# Patient Record
Sex: FEMALE | Race: WHITE | NOT HISPANIC OR LATINO | Employment: FULL TIME | URBAN - METROPOLITAN AREA
[De-identification: names, ages, dates, MRNs, and addresses within clinical notes are randomized per-mention and may not be internally consistent; named-entity substitution may affect disease eponyms.]

---

## 2019-11-27 ENCOUNTER — OFFICE VISIT (OUTPATIENT)
Dept: INTERNAL MEDICINE | Facility: CLINIC | Age: 24
End: 2019-11-27

## 2019-11-27 VITALS
TEMPERATURE: 98.7 F | HEART RATE: 100 BPM | HEIGHT: 64 IN | SYSTOLIC BLOOD PRESSURE: 116 MMHG | BODY MASS INDEX: 26.41 KG/M2 | OXYGEN SATURATION: 98 % | WEIGHT: 154.7 LBS | DIASTOLIC BLOOD PRESSURE: 76 MMHG

## 2019-11-27 DIAGNOSIS — K21.9 GASTROESOPHAGEAL REFLUX DISEASE, ESOPHAGITIS PRESENCE NOT SPECIFIED: ICD-10-CM

## 2019-11-27 DIAGNOSIS — Z00.00 HEALTH CARE MAINTENANCE: ICD-10-CM

## 2019-11-27 DIAGNOSIS — G43.109 MIGRAINE WITH AURA AND WITHOUT STATUS MIGRAINOSUS, NOT INTRACTABLE: ICD-10-CM

## 2019-11-27 DIAGNOSIS — D50.9 IRON DEFICIENCY ANEMIA, UNSPECIFIED IRON DEFICIENCY ANEMIA TYPE: ICD-10-CM

## 2019-11-27 DIAGNOSIS — R19.8 ALTERNATING CONSTIPATION AND DIARRHEA: ICD-10-CM

## 2019-11-27 DIAGNOSIS — J30.9 ALLERGIC RHINITIS, UNSPECIFIED SEASONALITY, UNSPECIFIED TRIGGER: ICD-10-CM

## 2019-11-27 DIAGNOSIS — F32.A DEPRESSION, UNSPECIFIED DEPRESSION TYPE: Primary | ICD-10-CM

## 2019-11-27 PROBLEM — J45.909 ASTHMA: Status: ACTIVE | Noted: 2019-11-27

## 2019-11-27 PROBLEM — G43.909 MIGRAINE: Status: ACTIVE | Noted: 2019-07-22

## 2019-11-27 PROCEDURE — 90471 IMMUNIZATION ADMIN: CPT | Performed by: NURSE PRACTITIONER

## 2019-11-27 PROCEDURE — 99204 OFFICE O/P NEW MOD 45 MIN: CPT | Performed by: NURSE PRACTITIONER

## 2019-11-27 PROCEDURE — 90674 CCIIV4 VAC NO PRSV 0.5 ML IM: CPT | Performed by: NURSE PRACTITIONER

## 2019-11-27 RX ORDER — PANTOPRAZOLE SODIUM 40 MG/1
40 TABLET, DELAYED RELEASE ORAL DAILY
Qty: 30 TABLET | Refills: 6 | Status: SHIPPED | OUTPATIENT
Start: 2019-11-27

## 2019-11-27 RX ORDER — OMEPRAZOLE 20 MG/1
20 CAPSULE, DELAYED RELEASE ORAL DAILY
COMMUNITY
Start: 2019-07-22 | End: 2019-11-27

## 2019-11-27 RX ORDER — TOPIRAMATE 25 MG/1
25 TABLET ORAL 2 TIMES DAILY
Qty: 60 TABLET | Refills: 11
Start: 2019-11-27 | End: 2020-02-27

## 2019-11-27 RX ORDER — PANTOPRAZOLE SODIUM 40 MG/1
40 TABLET, DELAYED RELEASE ORAL DAILY
Qty: 30 TABLET | Refills: 6 | Status: SHIPPED | OUTPATIENT
Start: 2019-11-27 | End: 2019-11-27 | Stop reason: SDUPTHER

## 2019-11-27 RX ORDER — TOPIRAMATE 25 MG/1
25 TABLET ORAL
COMMUNITY
Start: 2019-07-22 | End: 2019-11-27 | Stop reason: SDUPTHER

## 2019-11-27 RX ORDER — AZELASTINE HYDROCHLORIDE, FLUTICASONE PROPIONATE 137; 50 UG/1; UG/1
2 SPRAY, METERED NASAL
COMMUNITY
Start: 2019-09-18 | End: 2020-02-27

## 2019-11-27 RX ORDER — MONTELUKAST SODIUM 10 MG/1
TABLET ORAL
COMMUNITY
Start: 2019-10-20 | End: 2020-02-27

## 2019-11-27 NOTE — PROGRESS NOTES
"Subjective   Britney Cano is a 24 y.o. female.     History of Present Illness    The patient is here today with c/o intermittent diarrhea and constipation.   Had GB removed at 16 and has had problems since then.   Bowel obstruction and impaction a year later. She had a GI specialist for several years. She was transitioned from all meds to fiber and magnesium. These did not help and actually caused some nausea with magnesium.   GERD- She is going through a bottle of Tums a month even on the prilosec.   Moved here 3 yrs ago, was seeing a [place] doc. He was disrespectful.   Feels her health is ok, better than it has been in the past.     PSVT- seeing Dr. Brewster, had a holter in monitor, nl just fast per patient, to f/u in December  Migraines- \"really bad for the past 4 years\". Started topamax 4 months ago. 1 migraine a month.   AR- saw ENT in October b/c she had been going to [facility], had ear pain and sinusitis. She took antibiotic X2 and was told she didn't have an ear infection. TMJ  She has 5 sinus infections since August. She has not had allergy testing since childhood.   Asthma- allergy related, has not need an inhaler for a few years.   Depression- \"better now\"    Last week felt off balance, dizzy, headache, BP was 130/100, checked at nurses office. This has never happened before.     Reglan- seizure X1, none since    CPE- due next August  GYN- UTD, doing well, periods are absent, G-0  , from [place].   The following portions of the patient's history were reviewed and updated as appropriate: allergies, current medications, past family history, past medical history, past social history, past surgical history and problem list.    Review of Systems   Constitutional: Negative for chills and fever.   HENT: Positive for congestion, postnasal drip and rhinorrhea.    Respiratory: Negative.    Cardiovascular: Negative.    Gastrointestinal: Positive for constipation, diarrhea and GERD. "   Psychiatric/Behavioral: Negative for dysphoric mood and suicidal ideas. The patient is not nervous/anxious.        Objective   Physical Exam   Constitutional: She appears well-developed and well-nourished.   HENT:   Right Ear: Hearing and external ear normal. A foreign body (cerumen obstructing TM) is present.   Left Ear: Hearing, tympanic membrane, external ear and ear canal normal.   Nose: No mucosal edema, rhinorrhea or sinus tenderness. Right sinus exhibits no maxillary sinus tenderness and no frontal sinus tenderness. Left sinus exhibits maxillary sinus tenderness. Left sinus exhibits no frontal sinus tenderness.   Neck: Normal range of motion. Neck supple. No thyromegaly present.   Cardiovascular: Normal rate, regular rhythm, normal heart sounds and intact distal pulses.   Pulmonary/Chest: Effort normal and breath sounds normal.   Abdominal: Soft. Normal appearance and bowel sounds are normal. There is no hepatosplenomegaly. There is tenderness in the epigastric area.   Lymphadenopathy:     She has no cervical adenopathy.   Skin: Skin is warm and dry.   Psychiatric: She has a normal mood and affect. Her behavior is normal. Judgment and thought content normal.       Vitals:    11/27/19 1028   BP: 116/76   Pulse: 100   Temp: 98.7 °F (37.1 °C)   SpO2: 98%     Body mass index is 26.72 kg/m².      Assessment/Plan   Britney was seen today for diarrhea, constipation, sinusitis and migraine.    Diagnoses and all orders for this visit:    Depression, unspecified depression type  -     CBC & Differential  -     Comprehensive Metabolic Panel  -     Lipid Panel With LDL / HDL Ratio  -     TSH Rfx On Abnormal To Free T4  -     Vitamin D 25 Hydroxy    Migraine with aura and without status migrainosus, not intractable  -     CBC & Differential  -     Comprehensive Metabolic Panel  -     Lipid Panel With LDL / HDL Ratio  -     TSH Rfx On Abnormal To Free T4  -     Vitamin D 25 Hydroxy    Gastroesophageal reflux disease,  esophagitis presence not specified  -     pantoprazole (PROTONIX) 40 MG EC tablet; Take 1 tablet by mouth Daily.  -     Ambulatory Referral to Gastroenterology  -     CBC & Differential  -     Comprehensive Metabolic Panel  -     Lipid Panel With LDL / HDL Ratio  -     TSH Rfx On Abnormal To Free T4  -     Vitamin D 25 Hydroxy    Allergic rhinitis, unspecified seasonality, unspecified trigger  -     Ambulatory Referral to Allergy  -     CBC & Differential  -     Comprehensive Metabolic Panel  -     Lipid Panel With LDL / HDL Ratio  -     TSH Rfx On Abnormal To Free T4  -     Vitamin D 25 Hydroxy    Iron deficiency anemia, unspecified iron deficiency anemia type  -     CBC & Differential  -     Comprehensive Metabolic Panel  -     Lipid Panel With LDL / HDL Ratio  -     TSH Rfx On Abnormal To Free T4  -     Vitamin D 25 Hydroxy    Alternating constipation and diarrhea  -     Ambulatory Referral to Gastroenterology  -     CBC & Differential  -     Comprehensive Metabolic Panel  -     Lipid Panel With LDL / HDL Ratio  -     TSH Rfx On Abnormal To Free T4  -     Vitamin D 25 Hydroxy    Health care maintenance  -     CBC & Differential  -     Comprehensive Metabolic Panel  -     Lipid Panel With LDL / HDL Ratio  -     TSH Rfx On Abnormal To Free T4  -     Vitamin D 25 Hydroxy    Other orders  -     topiramate (TOPAMAX) 25 MG tablet; Take 1 tablet by mouth 2 (Two) Times a Day.                 1. Depression- self managed  2. Migraines- doing well, continue same  3. GERD- change protonix, low acid diet  4. AR- needs allergy referral   5. Iron def anemia- check labs, no more periods  6. Constipation and diarrhea- miralax as needed, refer to GI  7. Elevated BP- monitor, hydrate well    Flu vaccine- today  C-scope- 2011,2013 no polyps, neg for UC and chrons

## 2019-11-28 LAB
25(OH)D3+25(OH)D2 SERPL-MCNC: 16.9 NG/ML (ref 30–100)
ALBUMIN SERPL-MCNC: 4.9 G/DL (ref 3.5–5.2)
ALBUMIN/GLOB SERPL: 1.9 G/DL
ALP SERPL-CCNC: 90 U/L (ref 39–117)
ALT SERPL-CCNC: 9 U/L (ref 1–33)
AST SERPL-CCNC: 13 U/L (ref 1–32)
BASOPHILS # BLD AUTO: 0.02 10*3/MM3 (ref 0–0.2)
BASOPHILS NFR BLD AUTO: 0.3 % (ref 0–1.5)
BILIRUB SERPL-MCNC: 0.6 MG/DL (ref 0.2–1.2)
BUN SERPL-MCNC: 10 MG/DL (ref 6–20)
BUN/CREAT SERPL: 15.9 (ref 7–25)
CALCIUM SERPL-MCNC: 9.7 MG/DL (ref 8.6–10.5)
CHLORIDE SERPL-SCNC: 106 MMOL/L (ref 98–107)
CHOLEST SERPL-MCNC: 202 MG/DL (ref 0–200)
CO2 SERPL-SCNC: 21.8 MMOL/L (ref 22–29)
CREAT SERPL-MCNC: 0.63 MG/DL (ref 0.57–1)
EOSINOPHIL # BLD AUTO: 0.02 10*3/MM3 (ref 0–0.4)
EOSINOPHIL NFR BLD AUTO: 0.3 % (ref 0.3–6.2)
ERYTHROCYTE [DISTWIDTH] IN BLOOD BY AUTOMATED COUNT: 12 % (ref 12.3–15.4)
GLOBULIN SER CALC-MCNC: 2.6 GM/DL
GLUCOSE SERPL-MCNC: 87 MG/DL (ref 65–99)
HCT VFR BLD AUTO: 41.4 % (ref 34–46.6)
HDLC SERPL-MCNC: 59 MG/DL (ref 40–60)
HGB BLD-MCNC: 14.1 G/DL (ref 12–15.9)
IMM GRANULOCYTES # BLD AUTO: 0.02 10*3/MM3 (ref 0–0.05)
IMM GRANULOCYTES NFR BLD AUTO: 0.3 % (ref 0–0.5)
LDLC SERPL CALC-MCNC: 115 MG/DL (ref 0–100)
LDLC/HDLC SERPL: 1.94 {RATIO}
LYMPHOCYTES # BLD AUTO: 1.99 10*3/MM3 (ref 0.7–3.1)
LYMPHOCYTES NFR BLD AUTO: 28.6 % (ref 19.6–45.3)
MCH RBC QN AUTO: 29.9 PG (ref 26.6–33)
MCHC RBC AUTO-ENTMCNC: 34.1 G/DL (ref 31.5–35.7)
MCV RBC AUTO: 87.7 FL (ref 79–97)
MONOCYTES # BLD AUTO: 0.35 10*3/MM3 (ref 0.1–0.9)
MONOCYTES NFR BLD AUTO: 5 % (ref 5–12)
NEUTROPHILS # BLD AUTO: 4.55 10*3/MM3 (ref 1.7–7)
NEUTROPHILS NFR BLD AUTO: 65.5 % (ref 42.7–76)
NRBC BLD AUTO-RTO: 0 /100 WBC (ref 0–0.2)
PLATELET # BLD AUTO: 277 10*3/MM3 (ref 140–450)
POTASSIUM SERPL-SCNC: 3.8 MMOL/L (ref 3.5–5.2)
PROT SERPL-MCNC: 7.5 G/DL (ref 6–8.5)
RBC # BLD AUTO: 4.72 10*6/MM3 (ref 3.77–5.28)
SODIUM SERPL-SCNC: 141 MMOL/L (ref 136–145)
TRIGL SERPL-MCNC: 142 MG/DL (ref 0–150)
TSH SERPL DL<=0.005 MIU/L-ACNC: 0.88 UIU/ML (ref 0.27–4.2)
VLDLC SERPL CALC-MCNC: 28.4 MG/DL
WBC # BLD AUTO: 6.95 10*3/MM3 (ref 3.4–10.8)

## 2019-12-23 ENCOUNTER — TELEPHONE (OUTPATIENT)
Dept: INTERNAL MEDICINE | Facility: CLINIC | Age: 24
End: 2019-12-23

## 2019-12-23 RX ORDER — ERGOCALCIFEROL 1.25 MG/1
50000 CAPSULE ORAL WEEKLY
Qty: 8 CAPSULE | Refills: 0 | Status: SHIPPED | OUTPATIENT
Start: 2019-12-23 | End: 2020-02-27

## 2019-12-23 NOTE — TELEPHONE ENCOUNTER
----- Message from CLAUDETTE Douglass sent at 12/23/2019  4:42 PM EST -----  Mild cholesterol elevation, mediterranean style diet and exercise.  Vitamin D deficiency, start vitamin D2 50,000 IUs once weekly for 8 weeks then take vitamin D3 2000 IUs daily.  Recheck vitamin D in 3 months.  ----- Message -----  From: Jose Jara  Sent: 12/23/2019   3:48 PM EST  To: CLAUDETTE Douglass        ----- Message -----  From: Jolie Wells RegSched Rep  Sent: 12/23/2019   3:32 PM EST  To: Jose Jara    Pt was calling for the results of her blood work.

## 2020-01-02 ENCOUNTER — OFFICE VISIT (OUTPATIENT)
Dept: GASTROENTEROLOGY | Facility: CLINIC | Age: 25
End: 2020-01-02

## 2020-01-02 VITALS
DIASTOLIC BLOOD PRESSURE: 68 MMHG | BODY MASS INDEX: 25.88 KG/M2 | HEIGHT: 64 IN | TEMPERATURE: 98.7 F | WEIGHT: 151.6 LBS | SYSTOLIC BLOOD PRESSURE: 112 MMHG

## 2020-01-02 DIAGNOSIS — K58.2 IRRITABLE BOWEL SYNDROME WITH BOTH CONSTIPATION AND DIARRHEA: Primary | ICD-10-CM

## 2020-01-02 PROCEDURE — 99203 OFFICE O/P NEW LOW 30 MIN: CPT | Performed by: INTERNAL MEDICINE

## 2020-01-02 RX ORDER — CALCIUM CARBONATE 200(500)MG
1 TABLET,CHEWABLE ORAL DAILY
COMMUNITY

## 2020-01-02 RX ORDER — PRENATAL VIT NO.126/IRON/FOLIC 28MG-0.8MG
TABLET ORAL DAILY
COMMUNITY

## 2020-01-02 NOTE — PROGRESS NOTES
Answers for HPI/ROS submitted by the patient on 1/2/2020   Abdominal pain  Chronicity: chronic  Onset: more than 1 year ago  Onset quality: sudden  Progression since onset: waxing and waning  Pain location: RUQ, suprapubic region  Pain quality: cramping, sharp  anorexia: No  arthralgias: No  belching: Yes  constipation: Yes  diarrhea: Yes  dysuria: No  fever: No  flatus: Yes  frequency: No  headaches: No  hematochezia: Yes  hematuria: No  melena: No  myalgias: No  nausea: Yes  weight loss: No  vomiting: No  Aggravated by: certain positions, eating  Relieved by: belching, bowel movements, passing flatus  Chief Complaint   Patient presents with   • Constipation   • Diarrhea     Britney Cano is a 24 y.o. female who presents with a history of IBS and constipation  HPI     Patient 24-year-old female with history of asthma, anemia, acid reflux and IBS here for recommendations.  Patient seen by previous gastroenterologist in [place].  Chart in epic from [place] was reviewed and patient noted with chronic constipation having tried Linzess without success as the 290 was too strong in the 145 was inadequate.  Patient was then tried on magnesium oxide but moved shortly after.  Patient reports still symptomatic as the magnesium oxide had the same issue the high dose was too strong the lower dose was unsuccessful and patient has been going for the last 3 years untreated.  Patient continues to have alternating stools now here for further recommendations.    Past Medical History:   Diagnosis Date   • Acid reflux    • Allergic    • Anemia    • Asthma    • Depression    • Headache    • Irritable bowel syndrome    • Menstrual problem        Current Outpatient Medications:   •  calcium carbonate (TUMS) 500 MG chewable tablet, Chew 1 tablet Daily., Disp: , Rfl:   •  montelukast (SINGULAIR) 10 MG tablet, , Disp: , Rfl:   •  pantoprazole (PROTONIX) 40 MG EC tablet, Take 1 tablet by mouth Daily., Disp: 30 tablet, Rfl: 6  •  Prenatal  Vit-Fe Fumarate-FA (PRENATAL, CLASSIC, VITAMIN) 28-0.8 MG tablet tablet, Take  by mouth Daily., Disp: , Rfl:   •  topiramate (TOPAMAX) 25 MG tablet, Take 1 tablet by mouth 2 (Two) Times a Day., Disp: 60 tablet, Rfl: 11  •  vitamin D (ERGOCALCIFEROL) 1.25 MG (68925 UT) capsule capsule, Take 1 capsule by mouth 1 (One) Time Per Week., Disp: 8 capsule, Rfl: 0  •  Azelastine-Fluticasone 137-50 MCG/ACT suspension, 2 sprays into the nostril(s) as directed by provider., Disp: , Rfl:   •  saline 0.65 % nasal solution (BABY AYR) 0.65 % solution, 2 sprays into the nostril(s) as directed by provider., Disp: , Rfl:   Allergies   Allergen Reactions   • Metoclopramide Other (See Comments)     Siezures     • Azithromycin Rash   • Cefprozil Rash   • Levofloxacin Hives and Rash   • Loracarbef Itching and Rash   • Sulfa Antibiotics Rash     Social History     Socioeconomic History   • Marital status:      Spouse name: Julius   • Number of children: Not on file   • Years of education: Not on file   • Highest education level: Not on file   Occupational History   • Occupation: Teacher   Tobacco Use   • Smoking status: Never Smoker   • Smokeless tobacco: Never Used   Substance and Sexual Activity   • Alcohol use: No     Frequency: Never   • Drug use: No   • Sexual activity: Yes     Partners: Male     Birth control/protection: Pill   Lifestyle   • Physical activity:     Days per week: 0 days     Minutes per session: 0 min   • Stress: Not on file     Family History   Problem Relation Age of Onset   • Thyroid disease Mother    • Hypertension Father    • Diabetes Father    • Asthma Sister    • Asthma Brother    • Anxiety disorder Brother    • Cervical cancer Maternal Grandmother    • Hypertension Maternal Grandfather    • Diabetes Maternal Grandfather    • Liver cancer Maternal Grandfather    • Pancreatic cancer Maternal Grandfather    • Urolithiasis Maternal Grandfather    • Nephrolithiasis Paternal Grandmother    • Heart disease  Paternal Grandfather    • Hypertension Paternal Grandfather    • Diabetes Paternal Grandfather      Review of Systems   Constitutional: Negative.  Negative for fever.   HENT: Negative.    Eyes: Negative.    Respiratory: Negative.    Cardiovascular: Negative.    Gastrointestinal: Positive for abdominal pain, constipation, diarrhea and nausea. Negative for vomiting.   Endocrine: Negative.    Genitourinary: Negative for dysuria, frequency and hematuria.   Musculoskeletal: Negative.  Negative for arthralgias and myalgias.   Skin: Negative.    Allergic/Immunologic: Negative.    Neurological: Negative for headaches.   Hematological: Negative.      Vitals:    01/02/20 1022   BP: 112/68   Temp: 98.7 °F (37.1 °C)     Physical Exam   Constitutional: She is oriented to person, place, and time. She appears well-developed and well-nourished.   HENT:   Head: Normocephalic and atraumatic.   Eyes: Pupils are equal, round, and reactive to light. No scleral icterus.   Neck: Normal range of motion.   Cardiovascular: Normal rate, regular rhythm and normal heart sounds. Exam reveals no gallop and no friction rub.   No murmur heard.  Pulmonary/Chest: Effort normal and breath sounds normal. She has no wheezes. She has no rales.   Abdominal: Soft. Bowel sounds are normal. She exhibits no shifting dullness, no distension, no pulsatile liver, no fluid wave, no abdominal bruit, no ascites, no pulsatile midline mass and no mass. There is no hepatosplenomegaly. There is no tenderness. There is no rigidity and no guarding. No hernia.   Musculoskeletal: Normal range of motion. She exhibits no edema.   Lymphadenopathy:     She has no cervical adenopathy.   Neurological: She is alert and oriented to person, place, and time. No cranial nerve deficit.   Skin: Skin is warm and dry. No rash noted.   Psychiatric: She has a normal mood and affect. Her behavior is normal.   Nursing note and vitals reviewed.    Diagnoses and all orders for this  visit:    Irritable bowel syndrome with both constipation and diarrhea    Other orders  -     Prenatal Vit-Fe Fumarate-FA (PRENATAL, CLASSIC, VITAMIN) 28-0.8 MG tablet tablet; Take  by mouth Daily.  -     calcium carbonate (TUMS) 500 MG chewable tablet; Chew 1 tablet Daily.    Patient is a 24-year-old female with history of asthma reflux and IBS presenting in follow-up.  Patient with longstanding history of constipation now alternating with diarrhea who had been on number different medications but unfortunately not well tolerated.  When she last saw her gastroenterologist in [place], on my chart review, it was noted patient was given trial of magnesium oxide to help her symptoms.  Patient moved to [place] after that visit but reports since it did not help patient stopped taking her medications.  Patient continues to have recurrent abdominal pain and constipation with occasional overflow diarrhea.  Patient underwent upper and lower endoscopy in 2013 that were unremarkable patient now for further recommendations.  Patient has not tried Amitiza so we will give samples and begin trial of 24 mcg twice daily and adjust for clinical response.  If symptoms do not improve or patient cannot tolerate will arrange Sitzmarks test to evaluate motility further.

## 2020-02-11 ENCOUNTER — CLINICAL SUPPORT (OUTPATIENT)
Dept: INTERNAL MEDICINE | Facility: CLINIC | Age: 25
End: 2020-02-11

## 2020-02-11 DIAGNOSIS — Z23 NEED FOR PNEUMOCOCCAL VACCINATION: ICD-10-CM

## 2020-02-11 PROCEDURE — 90732 PPSV23 VACC 2 YRS+ SUBQ/IM: CPT | Performed by: NURSE PRACTITIONER

## 2020-02-11 PROCEDURE — 90471 IMMUNIZATION ADMIN: CPT | Performed by: NURSE PRACTITIONER

## 2020-02-20 ENCOUNTER — TELEPHONE (OUTPATIENT)
Dept: INTERNAL MEDICINE | Facility: CLINIC | Age: 25
End: 2020-02-20

## 2020-02-20 NOTE — TELEPHONE ENCOUNTER
I spoke with patient and informed her she is due to have Vitamin D level checked at the end of March. Patient verbalizes understanding.    Patient says she is due to have more pneumovax labs drawn. She has a lab order from her allergist. I told her she can schedule a lab appointment and to make sure she brings in lab order from allergist.

## 2020-02-20 NOTE — TELEPHONE ENCOUNTER
----- Message from Claritza Gallagher sent at 2/20/2020 12:03 PM EST -----  Regarding: APPT REQUEST?  Patient called to schedule a 1 month follow up on the pneumovax. I scheduled her an appt but not sure if she needs it. Please call to discuss. Thanks

## 2020-02-27 ENCOUNTER — OFFICE VISIT (OUTPATIENT)
Dept: INTERNAL MEDICINE | Facility: CLINIC | Age: 25
End: 2020-02-27

## 2020-02-27 VITALS
HEART RATE: 90 BPM | WEIGHT: 155.5 LBS | OXYGEN SATURATION: 98 % | BODY MASS INDEX: 26.55 KG/M2 | TEMPERATURE: 98.9 F | HEIGHT: 64 IN | DIASTOLIC BLOOD PRESSURE: 80 MMHG | SYSTOLIC BLOOD PRESSURE: 110 MMHG

## 2020-02-27 DIAGNOSIS — I51.89 DIASTOLIC DYSFUNCTION: ICD-10-CM

## 2020-02-27 DIAGNOSIS — I10 ESSENTIAL HYPERTENSION: ICD-10-CM

## 2020-02-27 DIAGNOSIS — E55.9 VITAMIN D DEFICIENCY: ICD-10-CM

## 2020-02-27 DIAGNOSIS — F41.9 ANXIETY: Primary | ICD-10-CM

## 2020-02-27 PROBLEM — R06.00 DYSPNEA: Status: ACTIVE | Noted: 2019-12-09

## 2020-02-27 PROBLEM — R00.0 TACHYCARDIA: Status: ACTIVE | Noted: 2019-12-09

## 2020-02-27 LAB
B-HCG UR QL: NEGATIVE
INTERNAL NEGATIVE CONTROL: NEGATIVE
INTERNAL POSITIVE CONTROL: POSITIVE
Lab: NORMAL

## 2020-02-27 PROCEDURE — 81025 URINE PREGNANCY TEST: CPT | Performed by: NURSE PRACTITIONER

## 2020-02-27 PROCEDURE — 99214 OFFICE O/P EST MOD 30 MIN: CPT | Performed by: NURSE PRACTITIONER

## 2020-02-27 RX ORDER — CHOLECALCIFEROL (VITAMIN D3) 25 MCG
1000 CAPSULE ORAL DAILY
Qty: 30 CAPSULE | Refills: 5 | Status: SHIPPED | OUTPATIENT
Start: 2020-02-27

## 2020-02-27 RX ORDER — SERTRALINE HYDROCHLORIDE 25 MG/1
25 TABLET, FILM COATED ORAL DAILY
Qty: 30 TABLET | Refills: 6 | Status: SHIPPED | OUTPATIENT
Start: 2020-02-27

## 2020-02-27 NOTE — PROGRESS NOTES
Subjective   Britney Cano is a 25 y.o. female.     History of Present Illness    The patient is here today with c/o elevated BP.   Hx of episode with pressure in the head, dizzy, hot months ago. She has since monitored and notes diastolic number is a little high.   She reports she saw her cardiologist and ECHO showed grade 1 diastolic dysfunction.    Migraines- off and on, twice a month, has not been taking topamax, eating better with mediterranean style diet   Vit D def- taking 1000 IU daily    Trying to get pregnant since January. Currently 2 weeks late with her period, all home tests negative 1 week ago.   The following portions of the patient's history were reviewed and updated as appropriate: allergies, current medications, past family history, past medical history, past social history, past surgical history and problem list.    Review of Systems   Constitutional: Negative.    Respiratory: Positive for cough (improving post bronchitis) and shortness of breath ( reports she is breathing heavier).    Cardiovascular: Negative for chest pain and palpitations (sometimes racing).   Psychiatric/Behavioral: Negative for suicidal ideas and depressed mood. The patient is nervous/anxious.        Objective   Physical Exam   Constitutional: She appears well-developed and well-nourished.   Neck: Normal range of motion. Neck supple. No thyromegaly present.   Cardiovascular: Normal rate, regular rhythm, normal heart sounds and intact distal pulses.   Pulmonary/Chest: Effort normal and breath sounds normal.   Lymphadenopathy:     She has no cervical adenopathy.   Skin: Skin is warm and dry.   Psychiatric: She has a normal mood and affect. Her behavior is normal. Judgment and thought content normal.       Vitals:    02/27/20 1117   BP: 110/80   Pulse: 90   Temp: 98.9 °F (37.2 °C)   SpO2: 98%     Body mass index is 26.84 kg/m².      Assessment/Plan   Britney was seen today for bp follow up, gerd and headache.    Diagnoses  and all orders for this visit:    Anxiety    Diastolic dysfunction    Essential hypertension    Vitamin D deficiency  -     Vitamin D 25 Hydroxy    Other orders  -     Cholecalciferol (VITAMIN D-3) 25 MCG (1000 UT) capsule; Take 1,000 Units by mouth Daily.  -     sertraline (ZOLOFT) 25 MG tablet; Take 1 tablet by mouth Daily.                 1. Anxiety- try zoloft 25 mg daily, discuss with OBGYN prior to starting, urine HCG negative  2. Diastolic dysfunction/elevated diastolic BP- continue to monitor, will treat BP first.   3. Vit D def- would like lab checked today

## 2020-02-28 LAB — 25(OH)D3+25(OH)D2 SERPL-MCNC: 24.6 NG/ML (ref 30–100)

## 2020-03-03 ENCOUNTER — OFFICE VISIT (OUTPATIENT)
Dept: GASTROENTEROLOGY | Facility: CLINIC | Age: 25
End: 2020-03-03

## 2020-03-03 VITALS
HEIGHT: 64 IN | BODY MASS INDEX: 26.53 KG/M2 | TEMPERATURE: 98.5 F | SYSTOLIC BLOOD PRESSURE: 116 MMHG | DIASTOLIC BLOOD PRESSURE: 80 MMHG | WEIGHT: 155.4 LBS

## 2020-03-03 DIAGNOSIS — K59.04 CHRONIC IDIOPATHIC CONSTIPATION: Primary | ICD-10-CM

## 2020-03-03 PROCEDURE — 99213 OFFICE O/P EST LOW 20 MIN: CPT | Performed by: INTERNAL MEDICINE

## 2020-03-03 NOTE — PROGRESS NOTES
Chief Complaint   Patient presents with   • Irritable Bowel Syndrome       Britney Cano is a  25 y.o. female here for a follow up visit for IBS with constipation.    HPI     Patient 25-year-old female with history of IBS and constipation here for follow-up.  Patient reports still symptomatic unable to tolerate the amities as it caused nausea.  Patient given prescription for truLance but is been unable to fill it due to coverage issues.  Patient here for further recommendations.    Past Medical History:   Diagnosis Date   • Acid reflux    • Allergic    • Anemia    • Asthma    • Depression    • Headache    • Irritable bowel syndrome    • Menstrual problem          Current Outpatient Medications:   •  calcium carbonate (TUMS) 500 MG chewable tablet, Chew 1 tablet Daily., Disp: , Rfl:   •  Cholecalciferol (VITAMIN D-3) 25 MCG (1000 UT) capsule, Take 1,000 Units by mouth Daily., Disp: 30 capsule, Rfl: 5  •  pantoprazole (PROTONIX) 40 MG EC tablet, Take 1 tablet by mouth Daily., Disp: 30 tablet, Rfl: 6  •  Prenatal Vit-Fe Fumarate-FA (PRENATAL, CLASSIC, VITAMIN) 28-0.8 MG tablet tablet, Take  by mouth Daily., Disp: , Rfl:   •  sertraline (ZOLOFT) 25 MG tablet, Take 1 tablet by mouth Daily., Disp: 30 tablet, Rfl: 6  •  Plecanatide (TRULANCE) 3 MG tablet, Take 1 tablet by mouth Daily., Disp: 30 tablet, Rfl: 11    Allergies   Allergen Reactions   • Metoclopramide Other (See Comments)     Siezures     • Azithromycin Rash   • Cefprozil Rash   • Levofloxacin Hives and Rash   • Loracarbef Itching and Rash   • Sulfa Antibiotics Rash       Social History     Socioeconomic History   • Marital status:      Spouse name: Julius   • Number of children: Not on file   • Years of education: Not on file   • Highest education level: Not on file   Occupational History   • Occupation: Teacher   Tobacco Use   • Smoking status: Never Smoker   • Smokeless tobacco: Never Used   Substance and Sexual Activity   • Alcohol use: No     Frequency:  Never   • Drug use: No   • Sexual activity: Yes     Partners: Male     Birth control/protection: Pill   Lifestyle   • Physical activity:     Days per week: 0 days     Minutes per session: 0 min   • Stress: Not on file       Family History   Problem Relation Age of Onset   • Thyroid disease Mother    • Hypertension Father    • Diabetes Father    • Asthma Sister    • Asthma Brother    • Anxiety disorder Brother    • Cervical cancer Maternal Grandmother    • Hypertension Maternal Grandfather    • Diabetes Maternal Grandfather    • Liver cancer Maternal Grandfather    • Pancreatic cancer Maternal Grandfather    • Urolithiasis Maternal Grandfather    • Nephrolithiasis Paternal Grandmother    • Heart disease Paternal Grandfather    • Hypertension Paternal Grandfather    • Diabetes Paternal Grandfather        Review of Systems   Constitutional: Negative.    Respiratory: Negative.    Cardiovascular: Negative.    Gastrointestinal: Negative.    Skin: Negative.    Hematological: Negative.        Vitals:    03/03/20 1426   BP: 116/80   Temp: 98.5 °F (36.9 °C)       Physical Exam   Constitutional: She is oriented to person, place, and time. She appears well-developed and well-nourished.   HENT:   Head: Normocephalic and atraumatic.   Eyes: Pupils are equal, round, and reactive to light. No scleral icterus.   Cardiovascular: Normal rate, regular rhythm and normal heart sounds.   Pulmonary/Chest: Effort normal and breath sounds normal.   Abdominal: Soft. Bowel sounds are normal. She exhibits no distension and no mass. There is no tenderness. No hernia.   Neurological: She is alert and oriented to person, place, and time.   Skin: Skin is warm and dry.   Psychiatric: She has a normal mood and affect. Her behavior is normal.   Vitals reviewed.      Office Visit on 02/27/2020   Component Date Value Ref Range Status   • 25 Hydroxy, Vitamin D 02/27/2020 24.6* 30.0 - 100.0 ng/ml Final   • HCG, Urine, QL 02/27/2020 Negative  Negative  Final   • Lot Number 02/27/2020 [id number]   Final   • Internal Positive Control 02/27/2020 Positive   Final   • Internal Negative Control 02/27/2020 Negative   Final       Britney was seen today for irritable bowel syndrome.    Diagnoses and all orders for this visit:    Chronic idiopathic constipation      Patient with history of chronic idiopathic constipation here for follow-up.  Patient unable to tolerate amities a, became nauseated.  Patient was ordered truLance but pharmacy still awaiting approval to fill it.  No information sent to us about prior Auth.  Unfortunately have no samples but did have some samples of Motegrity, will give 2 weeks worth of samples to see if it improves patient symptomatically.  We will continue to monitor as needed for symptom response.

## 2020-03-20 ENCOUNTER — TELEPHONE (OUTPATIENT)
Dept: GASTROENTEROLOGY | Facility: CLINIC | Age: 25
End: 2020-03-20

## 2020-03-20 NOTE — TELEPHONE ENCOUNTER
----- Message from Kateryna Guzman sent at 3/20/2020  2:53 PM EDT -----  Regarding: Medication Update   Contact: [phone]  Pt stated she was given a sample of Motegrity and that the medication is working and would like a prescription called in to her pharmacy

## 2020-03-24 ENCOUNTER — TELEPHONE (OUTPATIENT)
Dept: GASTROENTEROLOGY | Facility: CLINIC | Age: 25
End: 2020-03-24

## 2020-03-24 NOTE — TELEPHONE ENCOUNTER
----- Message from Kateryna Guzman Rep sent at 3/24/2020  2:09 PM EDT -----  Regarding: Insurance won't cover medication   Contact: [phone]  Pt stated insurance will not cover Motegrity and that [facility] faxed over 3 medications that are approved. Pt stated she tried Amitiza before but it didn't work, and asked to try one of the other two (pt did not remember the names of the other two).    Pt also stated [place] and asked that the prescription be called in to:    [facility]  [address] [address]    Phone: [phone]

## 2020-03-24 NOTE — TELEPHONE ENCOUNTER
Returned patient's phone call. Advised she will need to get the names of the medications her insurance will cover. She states she will and call back.

## 2020-03-27 ENCOUNTER — TELEPHONE (OUTPATIENT)
Dept: GASTROENTEROLOGY | Facility: CLINIC | Age: 25
End: 2020-03-27

## 2020-03-27 NOTE — TELEPHONE ENCOUNTER
Returned patient's phone call. She states her oop cost Linzess would be over $300. She states she has not met her deductible yet and any of the prescription medications will be expensive. She states she is going to call her insurance again to see what is available to her at a more reasonable oop cost. Advised patient to use Miralax until she finds out what medication will be covered. She verb understanding.

## 2020-03-27 NOTE — TELEPHONE ENCOUNTER
----- Message from Kateryna Saeed sent at 3/27/2020 12:05 PM EDT -----  Regarding: linaclotide (Linzess) 72 MCG   Contact: [phone]  Pt says she can not afford the medication.

## 2025-03-11 ENCOUNTER — READMISSION MANAGEMENT (OUTPATIENT)
Dept: CALL CENTER | Facility: HOSPITAL | Age: 30
End: 2025-03-11
Payer: COMMERCIAL

## 2025-03-11 NOTE — OUTREACH NOTE
Prep Survey      Flowsheet Row Responses   [facility] facility patient discharged from? Non-BH   Is LACE score < 7 ? Non-BH Discharge   Eligibility Not Eligible   What are the reasons patient is not eligible? Other  [Labor/Delivery]   Does the patient have one of the following disease processes/diagnoses(primary or secondary)? Other   Prep survey completed? Yes            Diane Gonzalez Registered Nurse